# Patient Record
Sex: FEMALE | Race: WHITE
[De-identification: names, ages, dates, MRNs, and addresses within clinical notes are randomized per-mention and may not be internally consistent; named-entity substitution may affect disease eponyms.]

---

## 2017-03-22 ENCOUNTER — HOSPITAL ENCOUNTER (OUTPATIENT)
Dept: HOSPITAL 80 - FIMAGING | Age: 62
End: 2017-03-22
Attending: GENERAL ACUTE CARE HOSPITAL
Payer: COMMERCIAL

## 2017-03-22 DIAGNOSIS — Z80.3: ICD-10-CM

## 2017-03-22 DIAGNOSIS — Z12.31: Primary | ICD-10-CM

## 2017-03-22 PROCEDURE — G0202 SCR MAMMO BI INCL CAD: HCPCS

## 2017-03-31 ENCOUNTER — HOSPITAL ENCOUNTER (OUTPATIENT)
Dept: HOSPITAL 80 - FIMAGING | Age: 62
End: 2017-03-31
Attending: OBSTETRICS & GYNECOLOGY
Payer: COMMERCIAL

## 2017-03-31 DIAGNOSIS — R92.2: ICD-10-CM

## 2017-03-31 DIAGNOSIS — Z12.39: Primary | ICD-10-CM

## 2017-11-30 ENCOUNTER — HOSPITAL ENCOUNTER (OUTPATIENT)
Dept: HOSPITAL 80 - FIMAGING | Age: 62
End: 2017-11-30
Attending: GENERAL ACUTE CARE HOSPITAL
Payer: COMMERCIAL

## 2017-11-30 DIAGNOSIS — Z12.39: Primary | ICD-10-CM

## 2017-11-30 PROCEDURE — G0206 DX MAMMO INCL CAD UNI: HCPCS

## 2018-04-11 ENCOUNTER — HOSPITAL ENCOUNTER (OUTPATIENT)
Dept: HOSPITAL 80 - FIMAGING | Age: 63
End: 2018-04-11
Attending: FAMILY MEDICINE
Payer: COMMERCIAL

## 2018-04-11 DIAGNOSIS — Z80.3: ICD-10-CM

## 2018-04-11 DIAGNOSIS — Z12.31: Primary | ICD-10-CM

## 2018-05-11 ENCOUNTER — HOSPITAL ENCOUNTER (EMERGENCY)
Dept: HOSPITAL 80 - FED | Age: 63
Discharge: HOME | End: 2018-05-11
Payer: COMMERCIAL

## 2018-05-11 VITALS — DIASTOLIC BLOOD PRESSURE: 90 MMHG | SYSTOLIC BLOOD PRESSURE: 132 MMHG

## 2018-05-11 DIAGNOSIS — R00.2: Primary | ICD-10-CM

## 2018-05-11 DIAGNOSIS — T44.5X5A: ICD-10-CM

## 2018-05-11 LAB — PLATELET # BLD: 260 10^3/UL (ref 150–400)

## 2018-05-11 NOTE — CPEKG
Heart Rate: 109

RR Interval: 550

P-R Interval: 180

QRSD Interval: 92

QT Interval: 348

QTC Interval: 469

P Axis: 73

QRS Axis: -1

T Wave Axis: 34

EKG Severity - BORDERLINE ECG -

EKG Impression: SINUS TACHYCARDIA

EKG Impression: BORDERLINE R WAVE PROGRESSION, ANTERIOR LEADS

Electronically Signed By: Deep Telles 11-May-2018 06:29:21

## 2018-05-11 NOTE — EDPHY
H & P


Stated Complaint: woke up feeling like ilan was having an allergic reaction


Time Seen by Provider: 05/11/18 02:59


HPI/ROS: 





Chief Complaint:  Heart palpitations, dry mouth





HPI:  62-year-old woman woke this morning with the sensation that her heart was 

racing, had dry mouth, some difficulty swallowing.  Patient states that felt 

somewhat similar to when she had allergic reaction to over 100+ bee stings many 

years ago.  Patient states that she had a physical yesterday and received the 

shingles vaccination at that time.  This was a 2nd in her shingles vaccination 

series.  Denied any throat swelling.  No chest pain.  No shortness of breath.  

She did administer her EpiPen.  Patient states that the dry mouth sensation has 

improved however she is still feeling like her heart is pounding.  No recent 

illness.  No fevers or chills.  No nausea or vomiting. 





ROS:  10 point Review of Systems is negative except as noted in the HPI.





PMH:  Hypothyroidism, bee sting allergy





Social History: No smoking, no alcohol,  no recreational drug use





Family History: non-contributory





Physical Exam:


Gen: Awake, Alert, No Distress


HEENT:  


     Nose: no rhinorrhea


     Eyes: PERRLA, EOMI


     Mouth: Moist mucosa 


Neck: Supple, no JVD


Chest: nontender, lungs clear to auscultation


Heart: S1, S2 normal, no murmur


Abd: Soft, non-tender, no guarding


Back: no CVA tenderness, no midline tenderness 


Ext: no edema, non-tender


Skin: no rash


Neuro: CN II-XII intact, Sensation grossly intact, Strength 5/5 in bilateral 

upper and lower extremities








- Personal History


Current Tetanus/Diphtheria Vaccine: Yes


Current Tetanus Diphtheria and Acellular Pertussis (TDAP): Yes





- Medical/Surgical History


Hx Asthma: No


Hx Chronic Respiratory Disease: No


Hx Diabetes: No


Hx Cardiac Disease: No


Hx Renal Disease: No


Hx Cirrhosis: No


Hx Alcoholism: No


Hx HIV/AIDS: No


Hx Splenectomy or Spleen Trauma: No


Other PMH: hypothyroid





- Social History


Smoking Status: Never smoked


Constitutional: 


 Initial Vital Signs











Temperature (C)  36.7 C   05/11/18 02:59


 


Heart Rate  112 H  05/11/18 02:59


 


Respiratory Rate  18   05/11/18 02:59


 


Blood Pressure  177/114 H  05/11/18 02:59


 


O2 Sat (%)  99   05/11/18 02:59








 











O2 Delivery Mode               Room Air














Allergies/Adverse Reactions: 


 





bee venom protein (honey bee) Allergy (Verified 05/11/18 02:59)


 








Home Medications: 














 Medication  Instructions  Recorded


 


Levothyroxine  05/11/18


 


predniSONE 60 mg PO DAILY #9 tab 05/11/18














Medical Decision Making





- Diagnostics


EKG Interpretation: 





ECG time 3:02 a.m., sinus tachycardia with a rate of 109, normal axis, normal 

intervals, no acute ST or T-wave changes.


ED Course/Re-evaluation: 





62-year-old woman presenting with symptoms that were similar to a prior 

allergic reaction.  She administered an EpiPen.  She did not have any chest 

pain.  Not having shortness of breath.  Primarily had very dry mouth and some 

lightheadedness and palpitations.  ECG after epinephrine shows a sinus 

tachycardia without any ischemic changes.  She is had improvement in her 

symptoms.  She has been given Benadryl, Solu-Medrol and Pepcid here.  She has 

been observed in the emergency depart for 2 hr without any recurrence of 

symptoms.  Plan will be to discharge her with continuing Benadryl and 

prednisone.  She will follow up with primary care physician for any further 

concerns.  She does not have risk factors for coronary artery disease.  Did not 

have any chest pain.  Did not have any diaphoresis.  Did not have any shortness 

of breath or any other anginal equivalents.





Patient is improved.  She is no longer tachycardic.  Feeling better.  Will 

discharge with outpatient follow-up





- Data Points


Laboratory Results: 


 Laboratory Results





 05/11/18 03:25 





 05/11/18 03:25 





 











  05/11/18 05/11/18





  03:25 03:25


 


WBC    12.55 10^3/uL H 10^3/uL





    (3.80-9.50) 


 


RBC    4.30 10^6/uL 10^6/uL





    (4.18-5.33) 


 


Hgb    13.0 g/dL g/dL





    (12.6-16.3) 


 


Hct    38.1 % %





    (38.0-47.0) 


 


MCV    88.6 fL fL





    (81.5-99.8) 


 


MCH    30.2 pg pg





    (27.9-34.1) 


 


MCHC    34.1 g/dL g/dL





    (32.4-36.7) 


 


RDW    12.9 % %





    (11.5-15.2) 


 


Plt Count    260 10^3/uL 10^3/uL





    (150-400) 


 


MPV    10.1 fL fL





    (8.7-11.7) 


 


Neut % (Auto)    80.0 % H %





    (39.3-74.2) 


 


Lymph % (Auto)    11.1 % L %





    (15.0-45.0) 


 


Mono % (Auto)    7.2 % %





    (4.5-13.0) 


 


Eos % (Auto)    0.9 % %





    (0.6-7.6) 


 


Baso % (Auto)    0.6 % %





    (0.3-1.7) 


 


Nucleat RBC Rel Count    0.0 % %





    (0.0-0.2) 


 


Absolute Neuts (auto)    10.06 10^3/uL H 10^3/uL





    (1.70-6.50) 


 


Absolute Lymphs (auto)    1.39 10^3/uL 10^3/uL





    (1.00-3.00) 


 


Absolute Monos (auto)    0.90 10^3/uL H 10^3/uL





    (0.30-0.80) 


 


Absolute Eos (auto)    0.11 10^3/uL 10^3/uL





    (0.03-0.40) 


 


Absolute Basos (auto)    0.07 10^3/uL 10^3/uL





    (0.02-0.10) 


 


Absolute Nucleated RBC    0.00 10^3/uL 10^3/uL





    (0-0.01) 


 


Immature Gran %    0.2 % %





    (0.0-1.1) 


 


Immature Gran #    0.02 10^3/uL 10^3/uL





    (0.00-0.10) 


 


Sodium  141 mEq/L mEq/L  





   (135-145)  


 


Potassium  3.7 mEq/L mEq/L  





   (3.5-5.2)  


 


Chloride  103 mEq/L mEq/L  





   ()  


 


Carbon Dioxide  23 mEq/l mEq/l  





   (22-31)  


 


Anion Gap  15 mEq/L mEq/L  





   (8-16)  


 


BUN  23 mg/dL mg/dL  





   (7-23)  


 


Creatinine  0.7 mg/dL mg/dL  





   (0.6-1.0)  


 


Estimated GFR  > 60   





   


 


Glucose  102 mg/dL H mg/dL  





   ()  


 


Calcium  9.3 mg/dL mg/dL  





   (8.5-10.4)  


 


Troponin I  < 0.012 ng/mL ng/mL  





   (0.000-0.034)  











Medications Given: 


 








Discontinued Medications





Diphenhydramine HCl (Benadryl Injection)  50 mg IVP EDNOW ONE


   Stop: 05/11/18 03:06


   Last Admin: 05/11/18 03:17 Dose:  50 mg


Famotidine (Pepcid)  20 mg IVP EDNOW ONE


   Stop: 05/11/18 03:06


   Last Admin: 05/11/18 03:25 Dose:  20 mg


Sodium Chloride (Ns)  1,000 mls @ 0 mls/hr IV ONCE ONE


   PRN Reason: Wide Open


   Stop: 05/11/18 03:19


   Last Admin: 05/11/18 03:18 Dose:  1,000 mls


Methylprednisolone Sodium Succinate (Solu-Medrol)  125 mg IVP EDNOW ONE


   Stop: 05/11/18 03:06


   Last Admin: 05/11/18 03:20 Dose:  125 mg








Departure





- Departure


Disposition: Home, Routine, Self-Care


Clinical Impression: 


 Allergic reaction





Condition: Good


Instructions:  General Allergic Reaction (ED)


Additional Instructions: 


Continue taking Benadryl, 50 mg, every 4-6 hr for the next 24 hr.


Complete a 3 day course of prednisone.


Follow up with primary care doctor in 2-3 days for further evaluation.


Return to the emergency department for worsening palpitations, chest pain, 

difficulty breathing, fevers, chills, or any other concerns.





Referrals: 


Armand Mas DO [Primary Care Provider] - As per Instructions


Prescriptions: 


predniSONE 60 mg PO DAILY #9 tab

## 2019-04-15 ENCOUNTER — HOSPITAL ENCOUNTER (OUTPATIENT)
Dept: HOSPITAL 80 - FIMAGING | Age: 64
End: 2019-04-15
Attending: OBSTETRICS & GYNECOLOGY
Payer: COMMERCIAL

## 2019-04-15 DIAGNOSIS — Z12.31: Primary | ICD-10-CM

## 2019-04-15 DIAGNOSIS — Z80.3: ICD-10-CM
